# Patient Record
Sex: MALE | Race: BLACK OR AFRICAN AMERICAN
[De-identification: names, ages, dates, MRNs, and addresses within clinical notes are randomized per-mention and may not be internally consistent; named-entity substitution may affect disease eponyms.]

---

## 2018-05-14 ENCOUNTER — HOSPITAL ENCOUNTER (EMERGENCY)
Dept: HOSPITAL 62 - ER | Age: 38
Discharge: HOME | End: 2018-05-14
Payer: COMMERCIAL

## 2018-05-14 VITALS — DIASTOLIC BLOOD PRESSURE: 87 MMHG | SYSTOLIC BLOOD PRESSURE: 132 MMHG

## 2018-05-14 DIAGNOSIS — Z71.6: ICD-10-CM
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
## 2018-05-14 DIAGNOSIS — F17.210: ICD-10-CM
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
## 2018-05-14 DIAGNOSIS — M54.5: Primary | ICD-10-CM

## 2018-05-14 DIAGNOSIS — I10: ICD-10-CM

## 2018-05-14 PROCEDURE — 96372 THER/PROPH/DIAG INJ SC/IM: CPT

## 2018-05-14 PROCEDURE — 72110 X-RAY EXAM L-2 SPINE 4/>VWS: CPT

## 2018-05-14 PROCEDURE — 99283 EMERGENCY DEPT VISIT LOW MDM: CPT

## 2018-05-14 NOTE — RADIOLOGY REPORT (SQ)
EXAM DESCRIPTION:  L SPINE WHOLE



COMPLETED DATE/TIME:  5/14/2018 2:15 pm



REASON FOR STUDY:  low back pain



COMPARISON:  None.



NUMBER OF VIEWS:  Five views including obliques.



TECHNIQUE:  AP, lateral, oblique, and sacral radiographic images acquired of the lumbar spine.



LIMITATIONS:  None.



FINDINGS:  MINERALIZATION: Normal.

SEGMENTATION: Normal.  No transitional anatomy.

ALIGNMENT: Very mild convex leftward lumbar curvature

VERTEBRAE: Maintained height.  No fracture or worrisome bone lesion.

DISCS: Preserved height.  No significant osteophytes or end plate irregularity.

POSTERIOR ELEMENTS: Pedicles and facets are intact.  No pars defect or posterior arch defects.  Mild 
bilateral facet arthropathy at L5-S1

HARDWARE: None in the spine.

PARASPINAL SOFT TISSUES: Normal.

PELVIS: Not in the field of view.  SI joints are unremarkable

OTHER: No other significant finding.



IMPRESSION:  L5-S1 facet arthropathy.



TECHNICAL DOCUMENTATION:  JOB ID:  [id number]

 2011 [employer]- All Rights Reserved



Reading location - IP/workstation name: [email or web address]

## 2018-05-14 NOTE — ER DOCUMENT REPORT
ED Neck/Back Problem





- General


Chief Complaint: Back Pain


Stated Complaint: BACK PAIN


Time Seen by Provider: 05/14/18 13:44


Mode of Arrival: Ambulatory


Information source: Patient


Notes: 





38-year-old male presents to ED for complaint of low back pain.  He states the 

pain started on Thursday after he picked up a heavy piece of furniture at work.

  He denies any loss of control of bowel or bladder.  Denies any urinary 

symptoms.  He is able to ambulate with a even steady gait.  He is alert and 

oriented pupils equal and react to light speaking in full even sentences.


TRAVEL OUTSIDE OF THE [place] IN LAST 30 DAYS: No





- HPI


Patient complains to provider of: Lower back


Onset: Last week


Where: Work - Thursday


Onset: Sudden


Timing: Still present


Quality of pain: Achy, Sharp


Severity: Severe


Pain Level: 5


Context: Lifting


Recent injury: Possibly


Associated symptoms: Like prior neck/back pain, Lower back pain.  denies: 

Incontinence, Motor loss, Numbness/tingling, Radiation to arm, Radiation to 

chest, Radiation to leg, Sensory loss, Unable to urinate, Upper back pain


Exacerbated by: Movement of trunk, Sitting position


Relieved by: Nothing


Similar symptoms previously: Yes


Recently seen / treated by doctor: No





- Related Data


Allergies/Adverse Reactions: 


 





No Known Allergies Allergy (Verified 05/14/18 12:58)


 











Past Medical History





- General


Information source: Patient





- Social History


Smoking Status: Current Every Day Smoker


Cigarette use (# per day): Yes - 6 or 7 cigarettes a day


Chew tobacco use (# tins/day): No


Smoking Education Provided: Yes - 4 min


Frequency of alcohol use: Heavy - 6 pack a day


Drug Abuse: Marijuana


Lives with: Friend


Family History: Reviewed & Not Pertinent


Patient has suicidal ideation: No


Patient has homicidal ideation: No





- Past Medical History


Cardiac Medical History: Reports: None


Pulmonary Medical History: Reports: None


EENT Medical History: Reports: None


Neurological Medical History: Reports: None


Endocrine Medical History: Reports: None


Renal/ Medical History: Reports: None


Malignancy Medical History: Reports None


GI Medical History: Reports: None


Musculoskeltal Medical History: Reports None


Skin Medical History: Reports None


Psychiatric Medical History: Reports: None


Traumatic Medical History: Reports: None


Infectious Medical History: Reports: None


Surgical Hx: Negative


Past Surgical History: Reports: None





- Immunizations


Immunizations up to date: Yes


Hx Diphtheria, Pertussis, Tetanus Vaccination: Yes





Review of Systems





- Review of Systems


Constitutional: No symptoms reported


EENT: No symptoms reported


Cardiovascular: No symptoms reported


Respiratory: No symptoms reported


Gastrointestinal: No symptoms reported


Genitourinary: No symptoms reported


Male Genitourinary: No symptoms reported


Musculoskeletal: Back pain, Muscle pain, Muscle stiffness


Skin: No symptoms reported


Hematologic/Lymphatic: No symptoms reported


Neurological/Psychological: No symptoms reported


-: Yes All other systems reviewed and negative





Physical Exam





- Vital signs


Vitals: 


 











Temp Pulse Resp BP Pulse Ox


 


 98.7 F   93   14   132/87 H  96 


 


 05/14/18 13:18  05/14/18 13:18  05/14/18 13:18  05/14/18 13:18  05/14/18 13:18











Interpretation: Normal





- General


General appearance: Appears well, Alert





- HEENT


Head: Normocephalic, Atraumatic


Eyes: Normal


Pupils: PERRL





- Respiratory


Respiratory status: No respiratory distress


Chest status: Nontender


Breath sounds: Normal


Chest palpation: Normal





- Cardiovascular


Rhythm: Regular


Heart sounds: Normal auscultation


Murmur: No





- Abdominal


Inspection: Normal


Distension: No distension


Bowel sounds: Normal


Tenderness: Nontender


Organomegaly: No organomegaly





- Back


Back: Normal, Tender, Vertebra tenderness.  No: Deformity/step-off, CVA 

tenderness, Scars, Scoliosis, Wounds





- Extremities


General upper extremity: Normal inspection, Nontender, Normal color, Normal ROM

, Normal temperature


General lower extremity: Normal inspection, Nontender, Normal color, Normal ROM

, Normal temperature, Normal weight bearing.  No: Keren's sign





- Neurological


Neuro grossly intact: Yes


Cognition: Normal


Orientation: AAOx4


Pete Coma Scale Eye Opening: Spontaneous


[place] Coma Scale Verbal: Oriented


[place] Coma Scale Motor: Obeys Commands


[place] Coma Scale Total: 15


Speech: Normal


Motor strength normal: LUE, RUE, LLE, RLE


Sensory: Normal





- Psychological


Associated symptoms: Normal affect, Normal mood





- Skin


Skin Temperature: Warm


Skin Moisture: Dry


Skin Color: Normal





Course





- Re-evaluation


Re-evalutation: 





05/14/18 14:25


After performing a Medical Screening Examination, I estimate there is LOW risk 

for EXPANDING OR RUPTURED ABDOMINAL AORTIC ANEURYSM, CAUDA EQUINA SYNDROME, 

EPIDURAL MASS LESION, or HERNIATED DISK CAUSING SEVERE SPINAL STENOSIS, thus I 

consider the discharge disposition reasonable.  I have reevaluated this patient 

multiple times and no significant life threatening changes are noted. The 

patient  and I have discussed the diagnosis and risks, and we agree with 

discharging home and close follow-up. We also discussed returning to the 

Emergency Department immediately if new or worsening symptoms occur with the 

understanding that symptoms and presentations can change. We have discussed the 

symptoms which are most concerning (e.g., saddle anesthesia, urinary or bowel 

incontinence or retention, changing or worsening pain) that necessitate 

immediate return.





- Vital Signs


Vital signs: 


 











Temp Pulse Resp BP Pulse Ox


 


 98.7 F   93   14   132/87 H  96 


 


 05/14/18 13:18  05/14/18 13:18  05/14/18 13:18  05/14/18 13:18  05/14/18 13:18














- Diagnostic Test


Radiology reviewed: Image reviewed, Reports reviewed





Discharge





- Discharge


Clinical Impression: 


Low back pain


Qualifiers:


 Chronicity: acute Back pain laterality: bilateral Sciatica presence: without 

sciatica Qualified Code(s): M54.5 - Low back pain





HTN (hypertension)


Qualifiers:


 Hypertension type: unspecified Qualified Code(s): I10 - Essential (primary) 

hypertension





Condition: Stable


Disposition: HOME, SELF-CARE


Instructions:  Family Physicians / Practices


Additional Instructions: 


LOW BACK PAIN:





     Three out of every four people will have an episode of disabling back pain 

during their lifetime.  Most commonly the pain is due to straining of the 

muscles and ligaments in the low back.


     Usual treatment includes: 


(1) Rest on a firm surface.  Avoid lying on your stomach.  


(2) Ice pack the painful area.  After a few days, gentle heat may be used 

intermittently to relax the area, or ice packs can be continued.  


(3) Medication may be needed -- muscle relaxers and antiinflammatory medicines 

are commonly used.  


(4) As the back improves, exercises are prescribed to strengthen the back and 

abdominal muscles.


     Your doctor will advise you on the proper care for your back at each stage 

in your recovery.  You may be better in a few days -- or healing may take 

several weeks.


     If new symptoms of a "herniated disc" (radiation of pain, numbness, or 

tingling down the back of the leg or weakness in the leg) occur, you should be 

re-examined.  Further testing may be necessary.





Toradol Injection





     You have been given an injection of ketorolac tromethamine (Toradol).  

This is an excellent, safe drug for pain control.  It also has potent 

antiinflammatory action.  You should have significant pain relief within about 

one hour.


     Toradol is not addicting and is non-sedating.  It does not interfere with 

driving or work.


     Call or return if you develop itching, hives, shortness of breath, or rash.





STEROID MEDICATION:





     You have been given an injection of medicine of the cortisone/steroid 

class.  This medication is used to control inflammation or allergy.  It is 

often continued as a pill for a short period of time, until the acute process 

subsides.


     There are usually no side effects from short-term use of cortisone-like 

medications.  Some persons feel an increased sense of well-being and are not 

sleepy at bedtime.  Long-term use of cortisone medications is best avoided, 

unless required for a severe condition.  If your condition does not remit, or 

relapses after the course of corticosteroid medication, you should consult your 

physician.











MUSCLE RELAXERS: 


     Muscle relaxing medications are usually prescribed for acute muscle spasm 

or injury to the neck and back.  They are often combined with antiinflammatory 

pain medication for increased relief.


     You may stop the muscle relaxer when the pain and stiffness have improved.

  Start the medication again if spasms recur.  


     Muscle relaxers may cause drowsiness, especially with the first dose.  Do 

not operate machinery or drive while under the effects of the medication.  Most 

muscle relaxers last up to 24 hours.  Do not combine the medication with 

alcohol.








ICE PACKS:


     Apply ice packs frequently against the painful area.  Many different 

schedules are recommended, such as "20 minutes on, 20 minutes off" or "one hour 

ice, two hours rest."  If you need to work, you may need to go longer between 

ice treatments.  You should plan to have the area ice packed AT LEAST one 

fourth of the time.


     The ice should be applied over the wrap, tape, or splint, or over a layer 

of cloth -- not directly against the skin.  Some ice bags have a built-in cloth 

and can be put directly on the skin.





WARM PACKS:


     After approximately two days, apply gentle heat (such as a heating pad or 

hot water bottle) for about 20 to 30 minutes about every two hours -- at least 

four times daily.  Warmth and elevation will help you make a more rapid recovery

, and will ease the pain considerably.


     Do not use HOT heat, and never apply heat for longer than 30 minutes.  The 

continuous heat can invisibly damage skin and muscles -- even when no burn is 

seen on the surface.  Damaged muscles can make you MORE sore.





Stretching Exercises for the Back





     The physician has recommended that you begin stretching exercises for your 

back.  These are often used even while the back is painful. However, you should 

notify the physician if the activities seem to increase your pain.


     PELVIC TILT:  Lie flat on your back with knees bent.  Tighten your stomach 

and buttock muscles so it flattens your lower back against the floor.  Hold 10 

seconds.  Repeat 10 times, twice daily.


     KNEE RAISE:  Lying on the back with knees bent, raise one knee to your 

chest, then the other.  Hold both knees against the chest 10 seconds, then 

lower one knee at a time.  Repeat 10 times, twice daily.


     PARTIAL TRUNK RAISE:  Lie face down, arms at your sides.  Keeping your 

waist on the floor, use your arms raise your chest up.  Support yourself on 

your elbows for 30 seconds.  Repeat twice daily, increasing the time to two 

minutes as you recover.





FOLLOW-UP CARE:


If you have been referred to a physician for follow-up care, call the physician

s office for an appointment as you were instructed or within the next two days.

  If you experience worsening or a significant change in your symptoms, notify 

the physician immediately or return to the Emergency Department at any time for 

re-evaluation.


Prescriptions: 


Cyclobenzaprine HCl [Flexeril 10 mg Tablet] 10 mg PO TIDP PRN #15 tab


 PRN Reason: 


Forms:  Elevated Blood Pressure, Smoking Cessation Education, Return to Work

## 2018-09-05 ENCOUNTER — HOSPITAL ENCOUNTER (EMERGENCY)
Dept: HOSPITAL 62 - ER | Age: 38
Discharge: HOME | End: 2018-09-05
Payer: OTHER GOVERNMENT

## 2018-09-05 VITALS — SYSTOLIC BLOOD PRESSURE: 114 MMHG | DIASTOLIC BLOOD PRESSURE: 77 MMHG

## 2018-09-05 DIAGNOSIS — F17.200: ICD-10-CM

## 2018-09-05 DIAGNOSIS — D64.9: ICD-10-CM

## 2018-09-05 DIAGNOSIS — K62.5: Primary | ICD-10-CM

## 2018-09-05 DIAGNOSIS — R10.10: ICD-10-CM

## 2018-09-05 DIAGNOSIS — K64.9: ICD-10-CM

## 2018-09-05 LAB
ABSOLUTE LYMPHOCYTES# (MANUAL): 2.5 10^3/UL (ref 0.5–4.7)
ABSOLUTE MONOCYTES # (MANUAL): 0.5 10^3/UL (ref 0.1–1.4)
ABSOLUTE NEUTROPHILS# (MANUAL): 2.8 10^3/UL (ref 1.7–8.2)
ADD MANUAL DIFF: YES
ALBUMIN SERPL-MCNC: 4.7 G/DL (ref 3.5–5)
ALP SERPL-CCNC: 56 U/L (ref 38–126)
ALT SERPL-CCNC: 31 U/L (ref 21–72)
ANION GAP SERPL CALC-SCNC: 11 MMOL/L (ref 5–19)
AST SERPL-CCNC: 57 U/L (ref 17–59)
BASOPHILS NFR BLD MANUAL: 2 % (ref 0–2)
BILIRUB DIRECT SERPL-MCNC: 0.3 MG/DL (ref 0–0.4)
BILIRUB SERPL-MCNC: 0.5 MG/DL (ref 0.2–1.3)
BUN SERPL-MCNC: 9 MG/DL (ref 7–20)
CALCIUM: 9.2 MG/DL (ref 8.4–10.2)
CHLORIDE SERPL-SCNC: 106 MMOL/L (ref 98–107)
CO2 SERPL-SCNC: 26 MMOL/L (ref 22–30)
EOSINOPHIL NFR BLD MANUAL: 2 % (ref 0–6)
ERYTHROCYTE [DISTWIDTH] IN BLOOD BY AUTOMATED COUNT: 12.8 % (ref 11.5–14)
ETHANOL SERPL-MCNC: 221 MG/DL
GLUCOSE SERPL-MCNC: 95 MG/DL (ref 75–110)
HCT VFR BLD CALC: 39.1 % (ref 37.9–51)
HGB BLD-MCNC: 13.3 G/DL (ref 13.5–17)
LIPASE SERPL-CCNC: 57.4 U/L (ref 23–300)
MCH RBC QN AUTO: 29.6 PG (ref 27–33.4)
MCHC RBC AUTO-ENTMCNC: 34.1 G/DL (ref 32–36)
MCV RBC AUTO: 87 FL (ref 80–97)
MONOCYTES % (MANUAL): 8 % (ref 3–13)
PLATELET # BLD: 267 10^3/UL (ref 150–450)
PLATELET COMMENT: ADEQUATE
POTASSIUM SERPL-SCNC: 4.6 MMOL/L (ref 3.6–5)
PROT SERPL-MCNC: 8.3 G/DL (ref 6.3–8.2)
RBC # BLD AUTO: 4.49 10^6/UL (ref 4.35–5.55)
RBC MORPH BLD: (no result)
SEGMENTED NEUTROPHILS % (MAN): 46 % (ref 42–78)
SODIUM SERPL-SCNC: 142.9 MMOL/L (ref 137–145)
TOTAL CELLS COUNTED BLD: 100
VARIANT LYMPHS NFR BLD MANUAL: 38 % (ref 13–45)
WBC # BLD AUTO: 6 10^3/UL (ref 4–10.5)

## 2018-09-05 PROCEDURE — 85025 COMPLETE CBC W/AUTO DIFF WBC: CPT

## 2018-09-05 PROCEDURE — 82272 OCCULT BLD FECES 1-3 TESTS: CPT

## 2018-09-05 PROCEDURE — 86901 BLOOD TYPING SEROLOGIC RH(D): CPT

## 2018-09-05 PROCEDURE — 99285 EMERGENCY DEPT VISIT HI MDM: CPT

## 2018-09-05 PROCEDURE — 86850 RBC ANTIBODY SCREEN: CPT

## 2018-09-05 PROCEDURE — 83690 ASSAY OF LIPASE: CPT

## 2018-09-05 PROCEDURE — 96361 HYDRATE IV INFUSION ADD-ON: CPT

## 2018-09-05 PROCEDURE — 80053 COMPREHEN METABOLIC PANEL: CPT

## 2018-09-05 PROCEDURE — S0164 INJECTION PANTROPRAZOLE: HCPCS

## 2018-09-05 PROCEDURE — 80307 DRUG TEST PRSMV CHEM ANLYZR: CPT

## 2018-09-05 PROCEDURE — 86900 BLOOD TYPING SEROLOGIC ABO: CPT

## 2018-09-05 PROCEDURE — 96374 THER/PROPH/DIAG INJ IV PUSH: CPT

## 2018-09-05 PROCEDURE — 36415 COLL VENOUS BLD VENIPUNCTURE: CPT

## 2018-09-05 NOTE — ER DOCUMENT REPORT
ED General





- General


Chief Complaint: Bloody Stools


Stated Complaint: ABDOMEN PAIN


Time Seen by Provider: 09/05/18 17:55


TRAVEL OUTSIDE OF THE [place] IN LAST 30 DAYS: No





- HPI


Notes: 





38-year-old male presents with blood in stools for the past 3 days.  He states 

it is dark blood.  He has had upper abdominal pain.  He admits to drinking 

about a sixpack of beers a day.  Denies history of ulcers.  Has had hemorrhoids 

in the past with bleeding in January.  Denies prior colonoscopy or endoscopy.  

Denies excessive NSAID use.  [facility] 

department for further evaluation.  Denies nausea, vomiting, fever, 

lightheadedness or syncopal episodes.





- Related Data


Allergies/Adverse Reactions: 


 





No Known Allergies Allergy (Verified 09/05/18 16:58)


 











Past Medical History





- Social History


Smoking Status: Current Every Day Smoker


Frequency of alcohol use: daily


Drug Abuse: None


Family History: Reviewed & Not Pertinent


Patient has suicidal ideation: No


Patient has homicidal ideation: No


Renal/ Medical History: Denies: Hx Peritoneal Dialysis





- Immunizations


Immunizations up to date: Yes


Hx Diphtheria, Pertussis, Tetanus Vaccination: Yes





Review of Systems





- Review of Systems


Notes: 





Constitutional: Negative for fever.


HENT: Negative for sore throat.


Eyes: Negative for visual changes.


Cardiovascular: Negative for chest pain.


Respiratory: Negative for shortness of breath.


Gastrointestinal: Positive for abdominal pain and rectal bleeding.  Negative 

for nausea, vomiting, diarrhea


Genitourinary: Negative for dysuria.


Musculoskeletal: Negative for back pain.


Skin: Negative for rash.


Neurological: Negative for headaches, weakness or numbness.





10 point ROS negative except as marked above and in HPI.





Physical Exam





- Vital signs


Vitals: 


 











Temp Pulse Resp BP Pulse Ox


 


 98.0 F   81   14   119/72   100 


 


 09/05/18 17:05  09/05/18 17:05  09/05/18 17:05  09/05/18 17:05 09/05/18 17:05














- Notes


Notes: 





PHYSICAL EXAMINATION:


GENERAL: Well-appearing, well-nourished and in no acute distress.


HEAD: Atraumatic, normocephalic.


EYES: Pupils equal round and reactive to light, extraocular movements intact, 

conjunctiva are normal.


ENT: nares patent, oropharynx clear without exudates.  Moist mucous membranes.


NECK: Normal range of motion, supple without lymphadenopathy


LUNGS: Breath sounds clear to auscultation bilaterally and equal.  No wheezes 

rales or rhonchi.


HEART: Regular rate and rhythm, no chest wall tenderness 


ABDOMEN: Soft, mild left upper and lower quadrant tenderness, normoactive bowel 

sounds.  No guarding, no rebound.  No masses appreciated.


EXTREMITIES: Normal range of motion, no pitting or edema.  No cyanosis.


NEUROLOGICAL: Cranial nerves grossly intact.  Normal speech, normal gait.  

Normal sensory and motor exams.


PSYCH: Normal mood, normal affect.


SKIN: Warm, Dry, normal turgor, no rashes or lesions noted.





- Rectal


Tenderness: Yes


Stool: Heme negative


Hemorrhoids: Internal


Prostate: Normal





Course





- Re-evaluation


Re-evalutation: 





09/05/18 22:05


Hemoccult negative.  Labs unremarkable with mild anemia.  Advised to avoid 

alcohol.  Will place on Nexium.





At this time will discharge with return precautions and follow-up 

recommendations.  Verbal discharge instructions given a the bedside and 

opportunity for questions given. Medication warnings reviewed. Patient is in 

agreement with this plan and has verbalized understanding of return precautions 

and the need for primary care follow-up in the next 24-72 hours.





Voice dictation software was used.  Chart was reviewed, but errors may exist.





- Vital Signs


Vital signs: 


 











Temp Pulse Resp BP Pulse Ox


 


 98.0 F   81   14   119/72   100 


 


 09/05/18 17:05  09/05/18 17:05  09/05/18 17:05  09/05/18 17:05  09/05/18 17:05














- Laboratory


Result Diagrams: 


 09/05/18 19:41





 09/05/18 19:41


Laboratory results interpreted by me: 


 











  09/05/18 09/05/18





  19:41 19:41


 


Hgb  13.3 L 


 


Total Protein   8.3 H














Discharge





- Discharge


Clinical Impression: 


 Rectal bleed





Hemorrhoid


Qualifiers:


 Hemorrhoid type: unspecified Qualified Code(s): K64.9 - Unspecified hemorrhoids





Condition: Stable


Disposition: HOME, SELF-CARE


Instructions:  Hemorrhoids ([facility]), Rectal Bleeding, Unclear Cause ([facility])


Additional Instructions: 


Avoid alcohol and NSAIDs as this will worsen your bleeding.  Follow-up with GI 

for further evaluation.  Return for worsening or concerning symptoms.


Prescriptions: 


Esomeprazole Mag Trihydrate [Nexium] 40 mg PO DAILY #20 capsule.


Hydrocortisone Acetate [Anusol Hc 25 mg Supp.rect] 1 supp.rect SD BID #14 

supp.rect


Referrals: 


XANDER DESIR NP [Primary Care Provider] - Follow up in 3-5 days


KENN MCMAHAN MD [ACTIVE STAFF] - Follow up as needed

## 2018-09-18 ENCOUNTER — HOSPITAL ENCOUNTER (EMERGENCY)
Dept: HOSPITAL 62 - ER | Age: 38
Discharge: HOME | End: 2018-09-18
Payer: OTHER GOVERNMENT

## 2018-09-18 DIAGNOSIS — F17.200: ICD-10-CM

## 2018-09-18 DIAGNOSIS — M79.601: ICD-10-CM

## 2018-09-18 DIAGNOSIS — L03.113: Primary | ICD-10-CM

## 2018-09-18 DIAGNOSIS — R74.8: ICD-10-CM
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
## 2018-09-18 DIAGNOSIS — T63.461A: ICD-10-CM

## 2018-09-18 LAB
ADD MANUAL DIFF: NO
ALBUMIN SERPL-MCNC: 4.9 G/DL (ref 3.5–5)
ALP SERPL-CCNC: 68 U/L (ref 38–126)
ALT SERPL-CCNC: 32 U/L (ref 21–72)
ANION GAP SERPL CALC-SCNC: 11 MMOL/L (ref 5–19)
AST SERPL-CCNC: 42 U/L (ref 17–59)
BASOPHILS # BLD AUTO: 0 10^3/UL (ref 0–0.2)
BASOPHILS NFR BLD AUTO: 0.3 % (ref 0–2)
BILIRUB DIRECT SERPL-MCNC: 0.5 MG/DL (ref 0–0.4)
BILIRUB SERPL-MCNC: 0.8 MG/DL (ref 0.2–1.3)
BUN SERPL-MCNC: 14 MG/DL (ref 7–20)
CALCIUM: 9.5 MG/DL (ref 8.4–10.2)
CHLORIDE SERPL-SCNC: 107 MMOL/L (ref 98–107)
CO2 SERPL-SCNC: 24 MMOL/L (ref 22–30)
CRP SERPL-MCNC: < 5 MG/L (ref ?–10)
EOSINOPHIL # BLD AUTO: 0.1 10^3/UL (ref 0–0.6)
EOSINOPHIL NFR BLD AUTO: 1.3 % (ref 0–6)
ERYTHROCYTE [DISTWIDTH] IN BLOOD BY AUTOMATED COUNT: 12.6 % (ref 11.5–14)
ERYTHROCYTE [SEDIMENTATION RATE] IN BLOOD: 2 MM/HR (ref 0–15)
GLUCOSE SERPL-MCNC: 85 MG/DL (ref 75–110)
HCT VFR BLD CALC: 41.5 % (ref 37.9–51)
HGB BLD-MCNC: 14.3 G/DL (ref 13.5–17)
LYMPHOCYTES # BLD AUTO: 1.1 10^3/UL (ref 0.5–4.7)
LYMPHOCYTES NFR BLD AUTO: 11.6 % (ref 13–45)
MCH RBC QN AUTO: 29.9 PG (ref 27–33.4)
MCHC RBC AUTO-ENTMCNC: 34.6 G/DL (ref 32–36)
MCV RBC AUTO: 86 FL (ref 80–97)
MONOCYTES # BLD AUTO: 0.7 10^3/UL (ref 0.1–1.4)
MONOCYTES NFR BLD AUTO: 8.1 % (ref 3–13)
NEUTROPHILS # BLD AUTO: 7.1 10^3/UL (ref 1.7–8.2)
NEUTS SEG NFR BLD AUTO: 78.7 % (ref 42–78)
PLATELET # BLD: 225 10^3/UL (ref 150–450)
POTASSIUM SERPL-SCNC: 4.3 MMOL/L (ref 3.6–5)
PROT SERPL-MCNC: 8.6 G/DL (ref 6.3–8.2)
RBC # BLD AUTO: 4.8 10^6/UL (ref 4.35–5.55)
SODIUM SERPL-SCNC: 141.8 MMOL/L (ref 137–145)
TOTAL CELLS COUNTED % (AUTO): 100 %
WBC # BLD AUTO: 9.1 10^3/UL (ref 4–10.5)

## 2018-09-18 PROCEDURE — 93971 EXTREMITY STUDY: CPT

## 2018-09-18 PROCEDURE — 85652 RBC SED RATE AUTOMATED: CPT

## 2018-09-18 PROCEDURE — 80053 COMPREHEN METABOLIC PANEL: CPT

## 2018-09-18 PROCEDURE — 86140 C-REACTIVE PROTEIN: CPT

## 2018-09-18 PROCEDURE — 73201 CT UPPER EXTREMITY W/DYE: CPT

## 2018-09-18 PROCEDURE — 96366 THER/PROPH/DIAG IV INF ADDON: CPT

## 2018-09-18 PROCEDURE — 82550 ASSAY OF CK (CPK): CPT

## 2018-09-18 PROCEDURE — 85379 FIBRIN DEGRADATION QUANT: CPT

## 2018-09-18 PROCEDURE — 36415 COLL VENOUS BLD VENIPUNCTURE: CPT

## 2018-09-18 PROCEDURE — 93931 UPPER EXTREMITY STUDY: CPT

## 2018-09-18 PROCEDURE — 85025 COMPLETE CBC W/AUTO DIFF WBC: CPT

## 2018-09-18 PROCEDURE — 73030 X-RAY EXAM OF SHOULDER: CPT

## 2018-09-18 PROCEDURE — 96365 THER/PROPH/DIAG IV INF INIT: CPT

## 2018-09-18 PROCEDURE — 87040 BLOOD CULTURE FOR BACTERIA: CPT

## 2018-09-18 PROCEDURE — 96361 HYDRATE IV INFUSION ADD-ON: CPT

## 2018-09-18 PROCEDURE — 96375 TX/PRO/DX INJ NEW DRUG ADDON: CPT

## 2018-09-18 PROCEDURE — 99284 EMERGENCY DEPT VISIT MOD MDM: CPT

## 2018-09-18 PROCEDURE — 73090 X-RAY EXAM OF FOREARM: CPT

## 2018-09-18 PROCEDURE — 83605 ASSAY OF LACTIC ACID: CPT

## 2018-09-18 NOTE — ER DOCUMENT REPORT
ED Medical Screen (RME)





- General


Chief Complaint: Arm Pain


Stated Complaint: RIGHT ARM PAIN, SWELLING


Time Seen by Provider: 09/18/18 10:37


Notes: 





38 years old male who woke up with right arm swelling and pain.  Before he went 

to bed he did not have any symptoms according to him.  But he was drinking beer 

yesterday.  Denies any numbness tingling sensation.  But has diffuse pain over 

both upper arm and forearm and elbow.  Had difficulty in flexing and extending 

the elbow.


Denies any known insect bites.





On examination right arm the swelling starts junction of the upper one third 

and lower two third of the arm extending all the way to the elbow to the 

proximal portion of the forearm two third of the way.


The entire arm is very warm and tender to touch.  There is a small opening 

noted on the lateral side of the forearm.





Neurovascular function distally appeared to be intact.


TRAVEL OUTSIDE OF THE [place] IN LAST 30 DAYS: No





- Related Data


Allergies/Adverse Reactions: 


 





No Known Allergies Allergy (Verified 09/18/18 10:20)


 











Past Medical History





- Social History


Chew tobacco use (# tins/day): No


Frequency of alcohol use: Heavy


Drug Abuse: None


Renal/ Medical History: Denies: Hx Peritoneal Dialysis


Psychiatric Medical History: Reports: Hx Depression





- Immunizations


Immunizations up to date: Yes


Hx Diphtheria, Pertussis, Tetanus Vaccination: Yes





Physical Exam





- Vital signs


Vitals: 





 











Temp Pulse Resp BP Pulse Ox


 


 98.6 F   100   16   123/83   97 


 


 09/18/18 10:24  09/18/18 10:24  09/18/18 10:24  09/18/18 10:24  09/18/18 10:24














Course





- Vital Signs


Vital signs: 





 











Temp Pulse Resp BP Pulse Ox


 


 98.6 F   100   16   123/83   97 


 


 09/18/18 10:24  09/18/18 10:24  09/18/18 10:24  09/18/18 10:24  09/18/18 10:24














Doctor's Discharge





- Discharge


Referrals: 


XANDER DESIR NP [Primary Care Provider] - Follow up as needed

## 2018-09-18 NOTE — ER DOCUMENT REPORT
ED General





- General


Chief Complaint: Arm Pain


Stated Complaint: RIGHT ARM PAIN, SWELLING


Time Seen by Provider: 09/18/18 10:37


Mode of Arrival: Ambulatory


Information source: Patient, Relative


Notes: 





38-year-old male with depression presents with complaint of right arm pain and 

swelling that began this morning.  Patient states that he went to bed last 

night without any issues and awoke with a very swollen very painful arm.  He 

denies any known injury.  His wife presented 10 minutes after I spoke to the 

patient and tells me that the patient was drinking heavily last night and fell 

off the bed where she left him on the floor until the morning.  She states that 

he did land onto his right side.  Patient denies any headache, nausea, vomiting

, IV drug use, recent IV insertion.  Wife also states that they both were 

bitten by yellow jackets multiple times in the upper extremity yesterday.


TRAVEL OUTSIDE OF THE [place] IN LAST 30 DAYS: No





- HPI


Onset: This morning


Onset/Duration: Gradual, Persistent, Worse


Quality of pain: Fullness, Throbbing


Severity: Moderate


Associated symptoms: denies: Chest pain, Productive cough, Fever, Nausea, 

Vomiting, Shortness of breath, Weakness


Exacerbated by: Movement


Relieved by: Denies


Similar symptoms previously: No


Recently seen / treated by doctor: No





- Related Data


Allergies/Adverse Reactions: 


 





No Known Allergies Allergy (Verified 09/18/18 10:20)


 











Past Medical History





- General


Information source: Patient





- Social History


Smoking Status: Current Every Day Smoker


Chew tobacco use (# tins/day): No


Frequency of alcohol use: Heavy


Drug Abuse: None


Lives with: Spouse/Significant other


Family History: Reviewed & Not Pertinent


Patient has suicidal ideation: No


Patient has homicidal ideation: No


Renal/ Medical History: Denies: Hx Peritoneal Dialysis


Psychiatric Medical History: Reports: Hx Depression





- Immunizations


Immunizations up to date: Yes


Hx Diphtheria, Pertussis, Tetanus Vaccination: Yes





Review of Systems





- Review of Systems


Notes: 





REVIEW OF SYSTEMS:


CONSTITUTIONAL :  Denies fever,  chills, or sweats.  Denies recent illness. 

Denies weight loss, recent hospitalizations. 


EENT: Denies visual changes, eye pain.  Denies sore throat, oral lesions, 

difficulty swallowing.


CARDIOVASCULAR:  Denies chest pain.  Denies palpitations. Denies lower 

extremity edema.


RESPIRATORY:  Denies cough. Denies shortness of breath, wheezing.


GASTROINTESTINAL:  Denies abdominal pain or distention.  Denies nausea, vomiting

, or diarrhea.  Denies blood in vomitus, stools, or per rectum.  Denies black, 

tarry stools.  Denies constipation.  


GENITOURINARY:  Denies difficulty urinating, painful urination,  frequency, 

blood in urine, testicular pain or penile discharge.


MUSCULOSKELETAL:  Denies back or neck pain or stiffness.  


SKIN:   Denies rash, lesions or sores.


HEMATOLOGIC :   Denies easy bruising or bleeding.


LYMPHATIC:  Denies swollen glands.


NEUROLOGICAL:  Denies confusion or altered mental status.  Denies loss of 

consciousness.  Denies dizziness or lightheadedness.  Denies headache.  Denies 

weakness or paralysis.  Denies problems difficulty with ambulation, slurred 

speech.  Denies sensory loss, numbness, or tingling.  Denies seizures.


PSYCHIATRIC:  Denies anxiety or stress.  Denies depression, suicidal ideation, 

or





Physical Exam





- Vital signs


Vitals: 


 











Temp Pulse Resp BP Pulse Ox


 


 98.6 F   100   16   123/83   97 


 


 09/18/18 10:24  09/18/18 10:24  09/18/18 10:24  09/18/18 10:24  09/18/18 10:24











Interpretation: Normal.  No: Hypertensive, Febrile





- Notes


Notes: 





PHYSICAL EXAMINATION:





GENERAL: Well-appearing, well-nourished and in no acute distress.





HEAD: Atraumatic, normocephalic.





EYES: Pupils equal round and reactive to light, extraocular movements intact, 

sclera anicteric, conjunctiva are normal.





ENT: Nares patent, oropharynx clear without exudates.  Moist mucous membranes.





NECK: Normal range of motion, supple without lymphadenopathy





LUNGS: Breath sounds clear to auscultation bilaterally and equal.  No wheezes 

rales or rhonchi.





HEART: Regular rate and rhythm without murmurs





ABDOMEN: Soft, nontender, nondistended abdomen.  No guarding, no rebound.  No 

masses appreciated.





Musculoskeletal: Swelling of the right forearm and right bicep.  Compartment 

soft.  Radial pulse intact.  Cap refill less than 2 seconds.  No obvious 

deformity.





NEUROLOGICAL: Cranial nerves grossly intact.  Normal speech, normal gait.  

Normal sensory, motor exams 





PSYCH: Normal mood, normal affect.





SKIN: Mild erythema to the upper extremity





Course





- Re-evaluation


Re-evalutation: 








Laboratory











  09/18/18 09/18/18 09/18/18





  11:12 11:12 11:12


 


WBC  9.1  


 


RBC  4.80  


 


Hgb  14.3  


 


Hct  41.5  


 


MCV  86  


 


MCH  29.9  


 


MCHC  34.6  


 


RDW  12.6  


 


Plt Count  225  


 


Seg Neutrophils %  78.7 H  


 


Lymphocytes %  11.6 L  


 


Monocytes %  8.1  


 


Eosinophils %  1.3  


 


Basophils %  0.3  


 


Absolute Neutrophils  7.1  


 


Absolute Lymphocytes  1.1  


 


Absolute Monocytes  0.7  


 


Absolute Eosinophils  0.1  


 


Absolute Basophils  0.0  


 


ESR  2  


 


D-Dimer   0.42 


 


Sodium    141.8


 


Potassium    4.3


 


Chloride    107


 


Carbon Dioxide    24


 


Anion Gap    11


 


BUN    14


 


Creatinine    0.84


 


Est GFR ( Amer)    > 60


 


Est GFR (Non-Af Amer)    > 60


 


Glucose    85


 


Lactic Acid   


 


Calcium    9.5


 


Total Bilirubin    0.8


 


Direct Bilirubin    0.5 H


 


Neonat Total Bilirubin    Not Reportable


 


Neonat Direct Bilirubin    Not Reportable


 


Neonat Indirect Bili    Not Reportable


 


AST    42


 


ALT    32


 


Alkaline Phosphatase    68


 


Creatine Kinase   


 


C-Reactive Protein    < 5.0


 


Total Protein    8.6 H


 


Albumin    4.9














  09/18/18 09/18/18 09/18/18





  11:12 11:12 20:53


 


WBC   


 


RBC   


 


Hgb   


 


Hct   


 


MCV   


 


MCH   


 


MCHC   


 


RDW   


 


Plt Count   


 


Seg Neutrophils %   


 


Lymphocytes %   


 


Monocytes %   


 


Eosinophils %   


 


Basophils %   


 


Absolute Neutrophils   


 


Absolute Lymphocytes   


 


Absolute Monocytes   


 


Absolute Eosinophils   


 


Absolute Basophils   


 


ESR   


 


D-Dimer   


 


Sodium   


 


Potassium   


 


Chloride   


 


Carbon Dioxide   


 


Anion Gap   


 


BUN   


 


Creatinine   


 


Est GFR ( Amer)   


 


Est GFR (Non-Af Amer)   


 


Glucose   


 


Lactic Acid  1.2  


 


Calcium   


 


Total Bilirubin   


 


Direct Bilirubin   


 


Neonat Total Bilirubin   


 


Neonat Direct Bilirubin   


 


Neonat Indirect Bili   


 


AST   


 


ALT   


 


Alkaline Phosphatase   


 


Creatine Kinase   514 H  425 H


 


C-Reactive Protein   


 


Total Protein   


 


Albumin   














 





Venous Doppler Study  09/18/18 00:00


IMPRESSION:  NO EVIDENCE DVT OR SVT RIGHT ARM.


 








Upper Extremity CT  09/18/18 17:34


IMPRESSION:  Arterial flow is seen to the wrist.  There is marked subcutaneous 

edema.


 








Upper Extremity Ultrasound  09/18/18 17:36


IMPRESSION:  NORMAL RIGHT UPPER EXTREMITY ARTERIAL DOPPLER.


 








Forearm X-Ray  09/18/18 17:42


IMPRESSION:  Marked soft tissue swelling.  No osseous abnormality.


 








Shoulder X-Ray  09/18/18 17:42


IMPRESSION:  NEGATIVE STUDY OF THE RIGHT SHOULDER. NO RADIOGRAPHIC EVIDENCE OF 

ACUTE INJURY.


 








09/18/18 19:35


I spoke to Dr. Olguin orthopedic surgeon on call regarding the patient's 

unusual presentation of arm swelling.  We did discuss negative ultrasound, 

negative x-rays.  He recommends if CAT scan is normal to have patient follow up 

immediately in his office tomorrow.


09/18/18 23:40





09/18/18 23:40


I spoke at length with the patient and his wife regarding the importance of 

follow-up with orthopedic surgery tomorrow.38-year-old male with history of 

depression presents with complaint of right arm pain and swelling that began 

this morning.  Wife states that the patient was heavily drinking and fell out 

of bed onto his right side where she left him overnight.  Upon arrival vital 

signs stable.  Patient does not appear toxic or dehydrated.  He is in no acute 

distress.  There is market swelling of the right arm when compared to the left.

  Ultrasound was obtained and negative for DVT.  X-rays of the forearm and 

shoulder were also obtained and showed no acute injury.  Forearm did show 

marketed soft tissue swelling.  Patient has full range of motion of this arm.  

He does have some mild erythema on the inner upper extremity.  I will continue 

the antibiotics that were started in triage.  Patient did receive Dilaudid 

during his ED course.  Lab and imaging findings were discussed with the patient 

and his wife.  Patient was found to have a mildly elevated CK level which on 

repeat is downtrending.  His compartments remain soft.  I urged the patient and 

his wife that he should be seen by orthopedic surgery tomorrow or return 

immediately to the emergency room if pain worsened.  Patient provided the 

opportunity to ask questions, and express concerns.  Discharge instructions 

discussed. Patient is agreeable with discharge home.  Return indications 

explained and discussed with the patient who displays understanding.  Patient 

encouraged to return to the emergency department immediately with any concerns.





09/19/18 01:26





09/19/18 01:26








- Vital Signs


Vital signs: 


 











Temp Pulse Resp BP Pulse Ox


 


 97.7 F   73   18   134/79 H  92 


 


 09/18/18 23:41  09/18/18 23:41  09/18/18 23:41  09/18/18 23:41  09/18/18 23:41














- Laboratory


Result Diagrams: 


 09/18/18 11:12





 09/18/18 11:12


Laboratory results interpreted by me: 


 











  09/18/18 09/18/18 09/18/18





  11:12 11:12 11:12


 


Seg Neutrophils %  78.7 H  


 


Lymphocytes %  11.6 L  


 


Direct Bilirubin   0.5 H 


 


Creatine Kinase    514 H


 


Total Protein   8.6 H 














  09/18/18





  20:53


 


Seg Neutrophils % 


 


Lymphocytes % 


 


Direct Bilirubin 


 


Creatine Kinase  425 H


 


Total Protein 














- Diagnostic Test


Radiology reviewed: Image reviewed, Reports reviewed





Discharge





- Discharge


Clinical Impression: 


 Swelling of upper extremity, Elevated CK





Allergic reaction


Qualifiers:


 Encounter type: initial encounter Qualified Code(s): T78.40XA - Allergy, 

unspecified, initial encounter





Cellulitis


Qualifiers:


 Site of cellulitis: extremity Site of cellulitis of extremity: upper extremity 

Laterality: right Qualified Code(s): L03.113 - Cellulitis of right upper limb





Condition: Good


Disposition: HOME, SELF-CARE


Instructions:  Arm Pain, Nonspecific ([facility]), Cellulitis ([facility])


Additional Instructions: 


You do not have evidence of a fracture on today's xrays. Your pain is likely to 

do soft tissue swelling and inflammation. This can last up to 6 weeks before 

completely resolving. You should continue to apply ice to the area regularly, 

keep the affected area elevated, and take ibuprofen 600mg every 6 hours as 

needed for pain. Please return if you have worsening pain, weakness, numbness, 

notice increasing redness or swelling to the area, develop a fever, or have any 

other symptoms that are concerning to you. 








Please complete your antibiotic course and follow-up with orthopedic surgeon 

Dr. Olguin tomorrow.





Most prescribed medications have multiple side effects.  The safest thing to do 

is when filling your prescription please speak to your pharmacist regarding 

possible interactions with your normal home medications and over the counter 

medications such as Ibuprofen, Tylenol, Benadryl..  If you experience any 

symptoms that cause you discomfort or concern you should discontinue the 

medication immediately and return to the emergency room or call your primary 

care physician.











Follow up with your physician tomorrow for further care or return to the ED 

IMMEDIATELY if symptoms worsen or new concerns occur. If you cannot afford to 

follow up with your primary care physician a list of low cost clinics have been 

provided at the end of your discharge papers as well.


Prescriptions: 


Cephalexin Monohydrate [Keflex 500 mg Capsule] 500 mg PO BID 5 Days #20 capsule


Oxycodone HCl/Acetaminophen [Percocet 5-325 mg Tablet] 1 tab PO Q6H PRN #15 tab


 PRN Reason: For Pain Scale 2-3


Sulfamethoxazole/Trimethoprim [Bactrim Ds Tablet] 1 each PO BID #20 tablet


Referrals: 


XANDER DESIR NP [NO LOCAL MD] - Follow up as needed


BROOKE OLGUIN DO [ACTIVE STAFF] - Follow up tomorrow

## 2018-09-18 NOTE — RADIOLOGY REPORT (SQ)
EXAM DESCRIPTION:  VENOUS UNILATERAL UPPER



COMPLETED DATE/TIME:  9/18/2018 7:14 pm



REASON FOR STUDY:  UPPER ARM SWELLING



COMPARISON:  None.



TECHNIQUE:  Dynamic and static gray scale and color images acquired of the right arm venous system. S
elected spectral images acquired with additional compression and augmentation maneuvers. The contrala
teral subclavian vein and internal jugular vein were also imaged. Images stored on PACS.



LIMITATIONS:  None.



FINDINGS:  INTERNAL JUGULAR VEIN: Normal phasicity, compression, augmentation. No visualized echogeni
c material on gray scale. No defects on color images. Comparison opposite side normal.

SUBCLAVIAN VEIN: Normal compression, augmentation. No visualized echogenic material on gray scale. No
 defects on color images.

AXILLARY VEIN: Normal compression, augmentation. No visualized echogenic material on gray scale. No d
efects on color images.

BRACHIAL VEIN: Normal compression, augmentation. No visualized echogenic material on gray scale. No d
efects on color images.

BASILIC VEIN: Normal compression, augmentation. No visualized echogenic material on gray scale. No de
fects on color images.

CEPHALIC VEIN: Normal compression, augmentation. No visualized echogenic material on gray scale. No d
efects on color images.

OTHER: No other significant finding.

CONTRALATERAL SUBCLAVIAN VEIN AND INTERNAL JUGULAR VEIN:

Normal phasicity, compression and augmentation. No visualized echogenic material on gray scale. No de
fects on color images.



IMPRESSION:  NO EVIDENCE DVT OR SVT RIGHT ARM.



TECHNICAL DOCUMENTATION:  JOB ID:  [id number]

 2011 [employer]- All Rights Reserved



Reading location - IP/workstation name: [email or web address]

## 2018-09-18 NOTE — RADIOLOGY REPORT (SQ)
EXAM DESCRIPTION:  SHOULDER RIGHT 2 OR MORE VIEWS



COMPLETED DATE/TIME:  9/18/2018 7:13 pm



REASON FOR STUDY:  fall



COMPARISON:  None.



NUMBER OF VIEWS:  Three views.



TECHNIQUE:  Internal rotation, external rotation, and Y view images acquired of the right shoulder.



LIMITATIONS:  None.



FINDINGS:  MINERALIZATION: Normal.

BONES: No acute fracture or dislocation.  No worrisome bone lesions.

JOINTS: No dislocation.

VISUALIZED LUNGS AND RIBS: No pneumothorax.  No rib fracture.

SOFT TISSUES: No radiopaque foreign body.

OTHER: No other significant finding.



IMPRESSION:  NEGATIVE STUDY OF THE RIGHT SHOULDER. NO RADIOGRAPHIC EVIDENCE OF ACUTE INJURY.



TECHNICAL DOCUMENTATION:  JOB ID:  [id number]

 2011 [employer]- All Rights Reserved



Reading location - IP/workstation name: [email or web address]

## 2018-09-18 NOTE — RADIOLOGY REPORT (SQ)
EXAM DESCRIPTION:  CT RT UPPER EXTREMITY WITH



COMPLETED DATE/TIME:  9/18/2018 7:37 pm



REASON FOR STUDY:  swollen (CTA RUNOFF Per MD)



COMPARISON:  None.



TECHNIQUE:  Postcontrast axial imaging performed through the right upper extremity with reformatted c
oronal and sagittal imaging windowed for bone and soft tissues.

Images saved to PACS.

3D IMAGING: Were 3D images as MIP, SSD, or volume rendering performed at the work station? No

All CT scanners at this facility use dose modulation, iterative reconstruction, and/or weight based d
osing when appropriate to reduce radiation dose to as low as reasonably achievable (ALARA).

[facility]: Dose Right  [facility]: CareDose    [facility]: Dose Right    [facility]: Teradose 4D    [facility]: Smart Technologies



CONTRAST TYPE AND DOSE:  contrast/concentration: Isovue 350.00 mg/ml; Total Contrast Delivered: 80.0 
ml; Total Saline Delivered: 60.0 ml



RENAL FUNCTION:  BUN 14 creatinine 0.84



LIMITATIONS:  None.



RADIATION DOSE:  CT Rad equipment meets quality standard of care and radiation dose reduction techniq
ues were employed. CTDIvol: 2.7 - 30.2 mGy. DLP: 200 mGy-cm.mGy.



FINDINGS:  SOFT TISSUES: Marked soft tissue swelling with marked subcutaneous edema in the distal arm
 and the forearm.

BONES: No acute fracture.  No dislocation.

MINERALIZATION: Normal.

ENHANCEMENT: No abnormal enhancement.

OTHER: Arterial flow was shown to the wrist.



IMPRESSION:  Arterial flow is seen to the wrist.  There is marked subcutaneous edema.



TECHNICAL DOCUMENTATION:  JOB ID:  [id number]

Quality ID # [id number]: Final reports with documentation of one or more dose reduction techniques (e.g., Au
tomated exposure control, adjustment of the mA and/or kV according to patient size, use of iterative 
reconstruction technique)

 2011 [employer]- All Rights Reserved



Reading location - IP/workstation name: [email or web address]

## 2018-09-18 NOTE — RADIOLOGY REPORT (SQ)
EXAM DESCRIPTION:  ARTERIAL UPPER EXTREM UNILAT



COMPLETED DATE/TIME:  9/18/2018 7:13 pm



REASON FOR STUDY:  right arm swelling



COMPARISON:  None.



TECHNIQUE:  Dynamic and static gray scale and color images acquired of the right upper extremity trip
julio cesar.  Additional selected spectral images recorded.  Images saved to PACS.



LIMITATIONS:  None.



FINDINGS:  RIGHT UPPER EXTREMITY:

Normal waveforms.  No significant stenosis is seen.  There is flow to the hand.



IMPRESSION:  NORMAL RIGHT UPPER EXTREMITY ARTERIAL DOPPLER.



TECHNICAL DOCUMENTATION:  JOB ID:  [id number]

 2011 [employer]- All Rights Reserved



Reading location - IP/workstation name: [email or web address]

## 2018-09-18 NOTE — RADIOLOGY REPORT (SQ)
EXAM DESCRIPTION:  FOREARM RIGHT



COMPLETED DATE/TIME:  9/18/2018 7:13 pm



REASON FOR STUDY:  fall



COMPARISON:  None.



NUMBER OF VIEWS:  Two views.



TECHNIQUE:  Two radiographic images acquired of the right forearm, including elbow and wrist in at le
ast one projection.



LIMITATIONS:  None.



FINDINGS:  MINERALIZATION: Normal.

BONES: No acute fracture.  No worrisome bone lesions.

SOFT TISSUES: Marked soft tissue swelling in the proximal forearm.

OTHER: No other significant finding.



IMPRESSION:  Marked soft tissue swelling.  No osseous abnormality.



TECHNICAL DOCUMENTATION:  JOB ID:  [id number]

 2011 [employer]- All Rights Reserved



Reading location - IP/workstation name: [email or web address]

## 2018-09-19 VITALS — SYSTOLIC BLOOD PRESSURE: 134 MMHG | DIASTOLIC BLOOD PRESSURE: 79 MMHG

## 2019-02-05 ENCOUNTER — HOSPITAL ENCOUNTER (EMERGENCY)
Dept: HOSPITAL 62 - ER | Age: 39
Discharge: HOME | End: 2019-02-05
Payer: OTHER GOVERNMENT

## 2019-02-05 VITALS — DIASTOLIC BLOOD PRESSURE: 81 MMHG | SYSTOLIC BLOOD PRESSURE: 127 MMHG

## 2019-02-05 DIAGNOSIS — F17.200: ICD-10-CM

## 2019-02-05 DIAGNOSIS — M54.40: Primary | ICD-10-CM

## 2019-02-05 PROCEDURE — 99283 EMERGENCY DEPT VISIT LOW MDM: CPT

## 2019-02-05 PROCEDURE — 96372 THER/PROPH/DIAG INJ SC/IM: CPT

## 2019-02-05 NOTE — ER DOCUMENT REPORT
HPI





- HPI


Time Seen by Provider: 02/05/19 12:02


Pain Level: 5


Notes: 





Patient is a 39-year-old male who presents to the emergency department 

complaining of chronic low back pain.  He states he had a back injury 

approximately 1 year ago.  He states over the last 4 weeks pain has increased 

due to his job as a .  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  

Patient denies any numbness or tingling to either of his lower extremities, 

denies any loss of bowels, denies any bladder retention.





- CONSTITUTIONAL


Constitutional: DENIES: Fever, Chills





Past Medical History





- General


Information source: Patient





- Social History


Smoking Status: Current Every Day Smoker


Frequency of alcohol use: Heavy


Drug Abuse: None


Family History: Reviewed & Not Pertinent


Patient has suicidal ideation: No


Patient has homicidal ideation: No


Renal/ Medical History: Denies: Hx Peritoneal Dialysis


Psychiatric Medical History: Reports: Hx Depression


Surgical Hx: Negative





- Immunizations


Immunizations up to date: Yes


Hx Diphtheria, Pertussis, Tetanus Vaccination: Yes





[employer] Provider Document





- CONSTITUTIONAL


Notes: 





PHYSICAL EXAMINATION:





GENERAL: Well-appearing, well-nourished and in no acute distress.





HEAD: Atraumatic, normocephalic.





EYES: Pupils equal round extraocular movements intact,  conjunctiva are normal.





ENT: Nares patent





NECK: Normal range of motion





LUNGS: No respiratory distress





Musculoskeletal: Normal range of motion, tenderness to palpation to bilateral 

lumbar paraspinous muscles.  No vertebral tenderness.





NEUROLOGICAL:  Normal speech, normal gait. 





PSYCH: Normal mood, normal affect.





SKIN: Warm, Dry, normal turgor, no rashes or lesions noted.





- INFECTION CONTROL


TRAVEL OUTSIDE OF THE [place] IN LAST 30 DAYS: No





Course





- Re-evaluation


Re-evalutation: 





Patient's physical examination as well as history are most consistent with 

musculoskeletal strain.  Patient has no red flag signs such as saddle 

anesthesia, urinary retention or bowel incontinence.  Patient will be treated 

for musculoskeletal pain and discharged home.  Follow-up with primary care.








- Vital Signs


Vital signs: 


                                        











Temp Pulse Resp BP Pulse Ox


 


 98.3 F   93   18   127/81 H  96 


 


 02/05/19 11:57  02/05/19 11:57  02/05/19 11:57  02/05/19 11:57  02/05/19 11:57














Discharge





- Discharge


Clinical Impression: 


Low back pain


Qualifiers:


 Chronicity: acute Back pain laterality: unspecified Sciatica presence: with 

sciatica Sciatica laterality: sciatica laterality unspecified Qualified Code(s):

M54.40 - Lumbago with sciatica, unspecified side





Condition: Stable


Disposition: HOME, SELF-CARE


Additional Instructions: 


You have been seen in the Emergency Department (ED)  today for back pain.  Your 

workup and exam have not shown any acute abnormalities and you are likely 

suffering from muscle strain or possible problems with your discs, but there is 

no treatment that will fix your symptoms at this time.  Please take the naproxen

that has been prescribed as directed. You should also purchase a local lidocaine

cream such as "aspercreme with lidocaine" and use per bottle instructions to the

affected area. Apply heat to the area as often as you are able. Continue to keep

active and avoid prolonged periods of bed rest.





Please follow up with your doctor as soon as possible regarding today's ED visit

and your back pain.  Return to the ED for worsening back pain, fever, weakness 

or numbness of either leg, or if you develop either (1) an inability to urinate 

or have bowel movements, or (2) loss of your ability to control your bathroom 

functions (if you start having "accidents"), or if you develop other new 

symptoms that concern you.concern you.





Please make sure you discuss your back pain with your [facility] provider when you see 

them later on this week.  They may be able to refer you to either physical 

therapy or schedule an MRI for you.


Forms:  Return to Work


Referrals: 


MARIELA WILLOUGHBY MD [ACTIVE STAFF] - Follow up as needed

## 2019-02-28 ENCOUNTER — HOSPITAL ENCOUNTER (EMERGENCY)
Dept: HOSPITAL 62 - ER | Age: 39
Discharge: HOME | End: 2019-02-28
Payer: COMMERCIAL

## 2019-02-28 VITALS — DIASTOLIC BLOOD PRESSURE: 87 MMHG | SYSTOLIC BLOOD PRESSURE: 146 MMHG

## 2019-02-28 DIAGNOSIS — G89.29: Primary | ICD-10-CM

## 2019-02-28 DIAGNOSIS — M54.5: ICD-10-CM
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
## 2019-02-28 PROCEDURE — 96372 THER/PROPH/DIAG INJ SC/IM: CPT

## 2019-02-28 PROCEDURE — 72110 X-RAY EXAM L-2 SPINE 4/>VWS: CPT

## 2019-02-28 PROCEDURE — 99283 EMERGENCY DEPT VISIT LOW MDM: CPT

## 2019-02-28 NOTE — RADIOLOGY REPORT (SQ)
EXAM DESCRIPTION:  L SPINE WHOLE



COMPLETED DATE/TIME:  2/28/2019 11:25 am



REASON FOR STUDY:  low back pain no known injury



COMPARISON:  5/14/2018



NUMBER OF VIEWS:  Five views including obliques.



TECHNIQUE:  AP, lateral, oblique, and sacral radiographic images acquired of the lumbar spine.



LIMITATIONS:  None.



FINDINGS:  MINERALIZATION: Normal.

SEGMENTATION: Normal.  No transitional anatomy.

ALIGNMENT: Normal.

VERTEBRAE: Maintained height.  No fracture or worrisome bone lesion.

DISCS: Preserved height.  No significant osteophytes or end plate irregularity.

POSTERIOR ELEMENTS: Pedicles and facets are intact.  No pars defect or posterior arch defects.  Mild 
bilateral facet arthropathy at L4-5 and L5-S1

HARDWARE: None in the spine.

PARASPINAL SOFT TISSUES: Normal.

PELVIS: SI joints intact.

OTHER: No other significant finding.



IMPRESSION:  Mild lower lumbar facet arthropathy



TECHNICAL DOCUMENTATION:  JOB ID:  [id number]

 2011 [employer]- All Rights Reserved



Reading location - IP/workstation name: [email or web address]

## 2019-02-28 NOTE — ER DOCUMENT REPORT
HPI





- HPI


Time Seen by Provider: 02/28/19 10:33


Pain Level: 2


Notes: 





Patient is a 39-year-old male with who presents to the ED complaining of acute 

on chronic low back pain.  Patient is a  and sits a lot.  Patient 

was seen earlier this month and [facility] 

[facility] and was given prednisone and was told they need to see physical therapy.  

Patient states that bending twisting of the trunk make his pain worse.  Patient 

states that the pain does occasionally radiate into his legs.  Patient states 

that he was standing up the other day and pain became severe enough for him to 

fall.  Patient states that that does not happen regularly and has been able to 

ambulate without significant difficulty since then.  He is eating and drinking 

without any difficulties.  He is urinating normally and having normal bowel 

movements.  He has not had any injections or procedures to his lower back.  

Denies any IV drug abuse.  No other concerns or complaints.  Denies any 

headache, fever, head injury, neck pain, changes in vision/speech/

mentation/hearing, URI, sore throat, chest pain, palpitations, syncope, cough, 

shortness of breath, wheeze, dyspnea, abdominal pain, nausea/vomiting/diarrhea, 

urinary retention, dysuria, hematuria, loss of control of bowel or bladder, 

numbness/tingling, saddle anesthesia, muscle paralysis/weakness, or rash.











- ROS


Systems Reviewed and Negative: Yes All other systems reviewed and negative





Past Medical History





- Social History


Smoking Status: Never Smoker


Family History: Reviewed & Not Pertinent


Renal/ Medical History: Denies: Hx Peritoneal Dialysis


Psychiatric Medical History: Reports: Hx Depression





- Immunizations


Immunizations up to date: Yes


Hx Diphtheria, Pertussis, Tetanus Vaccination: Yes





Vertical Provider Document





- CONSTITUTIONAL


Agree With Documented VS: Yes


Notes: 





PHYSICAL EXAMINATION:





GENERAL: Well-appearing, well-nourished and in no acute distress. 





LUNGS: Breath sounds clear to auscultation bilaterally and equal.  No wheezes 

rales or rhonchi.





HEART: Regular rate and rhythm without murmurs, rubs, gallops.





ABDOMEN: Soft, nontender, nondistended abdomen.  No guarding, no rebound.  No 

masses appreciated.  Normal bowel sounds present.  No CVA tenderness 

bilaterally.  No pulsatile mass.  Rectal tone intact.





Musculoskeletal: LE's b/l:  FROM to passive/active. Strength 5+/5.  No deficits 

noted.  No bony tenderness of extremities.





Back:  FROM to passive/active.  Strength 5+/5.  No vertebral point tenderness, 

stepoffs, or deformities.  No other bony tenderness, erythema, swelling, or 

ecchymosis.  SLR negative b/l.  Mild spasming.  No SI jt tenderness.  No foot 

drop





Extremities:  No cyanosis, clubbing, or edema b/l.  Peripheral pulses 2+.  

Capillary refill less than 2 seconds.





NEUROLOGICAL: Normal speech, normal gait.  Normal sensory, motor exams.  

Reflexes 2+ b/l.  





PSYCH: Normal mood, normal affect.





SKIN: Warm, Dry, normal turgor, no rashes or lesions noted.





- INFECTION CONTROL


TRAVEL OUTSIDE OF THE [place] IN LAST 30 DAYS: No





Course





- Re-evaluation


Re-evalutation: 





02/28/19 10:54


I did review imaging with Dr. Bingham who recommend an XR and no CT/MRI.





02/28/19 11:59


Patient is an afebrile, well-hydrated, 39-year-old male who presents to the ED 

with acute on chronic low back pain.  Vitals are acceptable.  PE is otherwise 

unremarkable for any focal neurological deficits.  X-ray was unremarkable for 

any acute pathology aside from mild lower facet arthropathy.  Patient was given 

Decadron, Toradol, and Lidoderm patch.  He has no significant tachycardia, 

tachypnea, or hypoxia.  He is nontoxic-appearing and is tolerating p.o. without 

difficulties.  There are no signs of infection.  No other red flag symptoms 

noted.  No other labs or imaging warranted at this time based on H&P.  Low 

suspicion for any meningitis, fracture, expanding/ruptured AAA, cauda equina 

syndrome, epidural mass lesion/abscess, herniated disc causing severe spinal 

stenosis, or other systemic infection at this time.  Patient is aware that his 

condition can change from initial presentation and that he needs monitor 

symptoms closely for any acute changes.  I will send him home with a 

prescription for baclofen and naproxen.  Conservative measures otherwise for 

symptoms.  Recheck with your PCM in 3-5 days.  Consider consult with 

orthopedic/physical therapy.  Return to the ED with any worsening/concerning 

symptoms otherwise as reviewed discharge.  Patient is in agreement.





- Vital Signs


Vital signs: 


                                        











Temp Pulse Resp BP Pulse Ox


 


 98.6 F   64   16   146/87 H  100 


 


 02/28/19 10:28  02/28/19 10:28  02/28/19 10:28  02/28/19 10:28  02/28/19 10:28














Discharge





- Discharge


Clinical Impression: 


 Acute exacerbation of chronic low back pain





Condition: Stable


Disposition: HOME, SELF-CARE


Instructions:  Low Back Pain ([facility]), Stretching Exercises for the Back ([facility]), 

Muscle Relaxers ([facility])


Additional Instructions: 


Rest, Ice


Tylenol/ibuprofen as needed


Light stretches daily


Strength exercises as able


Moist heat and massage may help


F/u with your PCP in 3-5 days for a recheck


Consider consult(s) with Orthopedics/physical therapy for ongoing/worsening 

symptoms





Return to the ED with any worsening symptoms and/or development of fever, 

headache, chest pain, palpitations, syncope, shortness of breath, trouble 

breathing, abdominal pain, n/v/d, blood in stool/urine, loss of control of 

bowel/bladder, urinary retention, muscle weakness/paralysis, saddle anesthesia, 

numbness/tingling, or other worsening symptoms that are concerning to you.


Prescriptions: 


Baclofen [Baclofen 10 mg Tablet] 5 - 10 mg PO BID PRN #10 tablet


 PRN Reason: 


Naproxen 500 mg PO BID #10 tablet


Forms:  Elevated Blood Pressure, Return to Work


Referrals: 


RAVINDER ABARCA DO [NO LOCAL MD] - Follow up as needed


[facility] [Provider Group] - Follow up as needed